# Patient Record
Sex: FEMALE | Race: OTHER | HISPANIC OR LATINO | URBAN - METROPOLITAN AREA
[De-identification: names, ages, dates, MRNs, and addresses within clinical notes are randomized per-mention and may not be internally consistent; named-entity substitution may affect disease eponyms.]

---

## 2023-09-14 ENCOUNTER — OFFICE (OUTPATIENT)
Dept: URBAN - METROPOLITAN AREA CLINIC 11 | Facility: CLINIC | Age: 33
End: 2023-09-14

## 2023-09-14 VITALS
DIASTOLIC BLOOD PRESSURE: 78 MMHG | SYSTOLIC BLOOD PRESSURE: 121 MMHG | WEIGHT: 139 LBS | HEART RATE: 71 BPM | OXYGEN SATURATION: 100 %

## 2023-09-14 DIAGNOSIS — K92.1 MELENA: ICD-10-CM

## 2023-09-14 DIAGNOSIS — K60.2 ANAL FISSURE, UNSPECIFIED: ICD-10-CM

## 2023-09-14 DIAGNOSIS — R19.4 CHANGE IN BOWEL HABIT: ICD-10-CM

## 2023-09-14 PROCEDURE — 99204 OFFICE O/P NEW MOD 45 MIN: CPT | Performed by: NURSE PRACTITIONER

## 2023-09-14 NOTE — SERVICEHPINOTES
NsParvez Saavedra is a 32 year old female here today with complaints of painful bowel movements, and rectal bleeding. She reports a few months ago she had pain with a bowel movement but did not think much of it.  She started having blood in her stool and in the toilet 2 weeks ago. She started having a sharp, tearing like pain with bowel movements as well.   She increase her water intake which made her stools softer and her symptoms resolved after a few days.  However, 3 days ago she started having symptoms again.  She has regular bowel movements 1-2 times a day.  she denies any nausea, vomiting, abdominal pain, diarrhea.

## 2023-09-14 NOTE — SERVICENOTES
will treat anal fissure with diltiazem.   Discussed given stool softener and avoiding straining.   Will see back in 2 months or sooner if needed

## 2023-12-18 ENCOUNTER — OFFICE (OUTPATIENT)
Dept: URBAN - METROPOLITAN AREA CLINIC 11 | Facility: CLINIC | Age: 33
End: 2023-12-18

## 2023-12-18 VITALS
SYSTOLIC BLOOD PRESSURE: 112 MMHG | WEIGHT: 142.4 LBS | HEIGHT: 67 IN | DIASTOLIC BLOOD PRESSURE: 72 MMHG | HEART RATE: 69 BPM | OXYGEN SATURATION: 97 %

## 2023-12-18 DIAGNOSIS — K60.2 ANAL FISSURE, UNSPECIFIED: ICD-10-CM

## 2023-12-18 DIAGNOSIS — R19.4 CHANGE IN BOWEL HABIT: ICD-10-CM

## 2023-12-18 DIAGNOSIS — K92.1 MELENA: ICD-10-CM

## 2023-12-18 PROCEDURE — 99214 OFFICE O/P EST MOD 30 MIN: CPT | Performed by: NURSE PRACTITIONER

## 2023-12-18 NOTE — SERVICENOTES
she will use diltiazem rectally and continue for at least 2 weeks once her symptoms resolve.  I feel her symptoms returned because she did not use the medication correctly or continue once her symptoms resolve.  Will see back in 3 months or sooner if needed

## 2023-12-18 NOTE — SERVICEHPINOTES
Ms. Saavedra is a 33 year old female here today with complaints of painful bowel movements and blood in stool.  At her first visit, she had painful bowel movements and rectal bleeding. She had a sharp, tearing like pain with bowel movements as well.   She increased her water intake which made her stools softer and her symptoms resolved after a few days but returned some time later.    She was treated with diltiazem for an anal fissure.  In follow up today, she started having painful stools and a little BRB with BMs a few weeks ago.  She had a sharp, tearing like pain with a bowel movement a few weeks ago and called the office.  She used preparation H suppositories and those helped her symptoms some.  She has bowel movements daily and occasionally has hard stool. She was not using the diltiazem cream rectally because the pharmacist told her to apply at perianally.   She also stopped using the medication as soon as her symptoms resolved after her last visit.   She denies any nausea, vomiting, abdominal pain.

## 2024-03-18 ENCOUNTER — OFFICE (OUTPATIENT)
Dept: URBAN - METROPOLITAN AREA CLINIC 11 | Facility: CLINIC | Age: 34
End: 2024-03-18

## 2024-03-18 VITALS
DIASTOLIC BLOOD PRESSURE: 71 MMHG | HEART RATE: 76 BPM | WEIGHT: 141 LBS | HEIGHT: 67 IN | SYSTOLIC BLOOD PRESSURE: 114 MMHG | OXYGEN SATURATION: 99 %

## 2024-03-18 DIAGNOSIS — R19.4 CHANGE IN BOWEL HABIT: ICD-10-CM

## 2024-03-18 DIAGNOSIS — K64.4 RESIDUAL HEMORRHOIDAL SKIN TAGS: ICD-10-CM

## 2024-03-18 DIAGNOSIS — K92.1 MELENA: ICD-10-CM

## 2024-03-18 DIAGNOSIS — K60.2 ANAL FISSURE, UNSPECIFIED: ICD-10-CM

## 2024-03-18 PROCEDURE — 99214 OFFICE O/P EST MOD 30 MIN: CPT | Performed by: NURSE PRACTITIONER

## 2024-03-18 RX ORDER — HYDROCORTISONE ACETATE PRAMOXINE HCL 2.5; 1 G/100G; G/100G
CREAM TOPICAL
Qty: 30 | Refills: 3 | Status: ACTIVE
Start: 2024-03-18

## 2024-03-18 NOTE — SERVICEHPINOTES
Ms. Saavedra is a 33 year old female here today for follow up of an anal fissure.  She had painful bowel movements and rectal bleeding. She had a sharp, tearing like pain with bowel movements as well.  She increased her water intake which made her stools softer and her symptoms resolved after a few days but returned some time later.  She was treated with diltiazem for an anal fissure. She stopped using the medication as soon as her symptoms resolved.   She started having painful stools and a little BRB with BMs again. She had a sharp, tearing like pain with a bowel movement and used preparation H suppositories which helped her symptoms some.  She was not using the diltiazem cream rectally because the pharmacist told her to apply at perianally. In follow up today,  she reports she has some rectal discomfort and a external hemorrhoid.  She had a big event recently and had been under a lot of stress the previous few weeks and it had soft stools and denied any symptoms at that time.  She has had some sharp, glasslike pain with bowel movements and rectal discomfort.   She never used the diltiazem rectally as I discussed at her last visit because she thought her symptoms were possibly related to a hemorrhoid.  She does have some blood when she wipes as well.  She denies any nausea, vomiting, abdominal pain.

## 2024-03-18 NOTE — SERVICENOTES
she was noted to have a small nonthrombosed hemorrhoid on exam today.   Will send in hydrocortisone cream and she will use OTC preparation H suppositories as needed.   She does still  possible anal fissure but thinks her symptoms are from the hemorrhoid.   Discussed trying the hydrocortisone cream and suppositories and if that did not help her symptoms and she will call the office and I will send a refill for the diltiazem.   She never use the diltiazem rectally.  She only used perianally because that is what the pharmacist told her to do.  will see back in 3 months or sooner if needed